# Patient Record
Sex: FEMALE | URBAN - METROPOLITAN AREA
[De-identification: names, ages, dates, MRNs, and addresses within clinical notes are randomized per-mention and may not be internally consistent; named-entity substitution may affect disease eponyms.]

---

## 2017-08-10 ENCOUNTER — EMERGENCY (EMERGENCY)
Facility: HOSPITAL | Age: 22
LOS: 1 days | Discharge: HOME | End: 2017-08-10
Attending: EMERGENCY MEDICINE | Admitting: EMERGENCY MEDICINE
Payer: COMMERCIAL

## 2017-08-10 VITALS
DIASTOLIC BLOOD PRESSURE: 75 MMHG | OXYGEN SATURATION: 100 % | WEIGHT: 139.99 LBS | RESPIRATION RATE: 17 BRPM | HEART RATE: 74 BPM | HEIGHT: 62 IN | TEMPERATURE: 98 F | SYSTOLIC BLOOD PRESSURE: 121 MMHG

## 2017-08-10 DIAGNOSIS — Z79.2 LONG TERM (CURRENT) USE OF ANTIBIOTICS: ICD-10-CM
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
## 2017-08-10 DIAGNOSIS — S80.862A INSECT BITE (NONVENOMOUS), LEFT LOWER LEG, INITIAL ENCOUNTER: ICD-10-CM
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
## 2017-08-10 PROCEDURE — 99283 EMERGENCY DEPT VISIT LOW MDM: CPT

## 2017-08-10 RX ADMIN — Medication 200 MILLIGRAM(S): at 14:56

## 2017-08-10 NOTE — ED PROVIDER NOTE - OBJECTIVE STATEMENT
Pt with no sig PMHx p/w bite to LLE, onset this morning. Pt is concerned it is a tick bite because it is larger than normal and painful. Pt did not see any ticks on her this weekend, but pt was in [place] this weekend, and has been in the [facility] intermittently throughout the summer. The bite is not itchy. No f/c. No other complaints.

## 2017-08-10 NOTE — ED ADULT NURSE NOTE - OBJECTIVE STATEMENT
Pt came to ED complaining of pain, redness, swelling, possible insect bit to left posterior leg.  Pt reports she was on [facility] for the past several days.  Pt reports she "wasn't feeling well, then the pain started today". Pt denies fever, chills, Dizziness, vomiting. Reports mild nausea.  The is a small approx. 0.2cm bite to left lower leg with redness, swelling, and tenderness.  Pt denies chest pain, SOB, abd pain, /GI symptoms.  Alert and oriented x3.

## 2017-08-10 NOTE — ED PROVIDER NOTE - MEDICAL DECISION MAKING DETAILS
Pt presents for evaluation of possible tick bite. Bite appeared this morning s/p traveling to endemic region for Lyme's. No clear target lesion. mildly ecchymotic appearing. No tick noted on body. Line drawn around area of erythema for close observation. Patient information regarding Lyme's and onset of prophylaxis printed for patient and given to pt for information. Pt does not have PCP. RC to set pt up with PCP for f/u visit. If pt unable to f/u in 2 days, will bring back to ED for wound check. Pt instructed to RTC for spreading of erythema, worsening erythema, bull's eye / target lesion, or other concerning sx, as she may need treatment rather than just prophylaxis for Lyme's. Will give Doxy 200 mg x 1 now for prophylaxis. No tick visualization, however given endemic region, benefit of prophylaxis outweighs risk of one-time abx dosing

## 2017-08-10 NOTE — ED ADULT NURSE REASSESSMENT NOTE - NS ED NURSE REASSESS COMMENT FT1
Pt is discharged.  Pt left ED without notifying RN or ED staff or receiving DC instructions or appt info for follow up.  MD Colmenares left voicemail for pt. Pt is alert and oriented x3, ambulatory with even gait.

## 2017-08-10 NOTE — ED ADULT NURSE NOTE - OTHER COMPLAINTS
pt reports being in [facility] over the weekend, admits to possible tick bite. c.o LLE pain, swelling/redness. denies fever/chills.

## 2017-08-10 NOTE — ED PROVIDER NOTE - SKIN, MLM
Skin normal color for race, warm, dry and intact. circular area of mild erythema approx 5-6 cm in diameter w/ central excoriation, mildly ecchymotic appearing. no induration / /fluctuance. no streaking, crepitus, or vesicles. no clear target lesion. + mild ttp

## 2017-08-10 NOTE — ED PROVIDER NOTE - PROGRESS NOTE DETAILS
Unable to find pt s/p receiving Doxycyline. Pt did not receive discharge instructions, nor did she receive the appt made by CHELY Medley (Dr Lemon 8/29 @ 11am). This appt was emailed to pt at time noted pt had left the ED, and I called the pt and left a message on the number listed in this EMR.

## 2017-08-10 NOTE — ED ADULT TRIAGE NOTE - OTHER COMPLAINTS
pt reports being in [place] [place], admits to possible tick bite. c.o LLE pain, swelling/redness. denies fever/chills.

## 2017-08-23 ENCOUNTER — RESULT REVIEW (OUTPATIENT)
Age: 22
End: 2017-08-23

## 2017-08-29 ENCOUNTER — APPOINTMENT (OUTPATIENT)
Dept: INTERNAL MEDICINE | Facility: CLINIC | Age: 22
End: 2017-08-29

## 2018-04-24 NOTE — ED PROVIDER NOTE - CROS ED CONS ALL NEG
negative...
I personally performed the service described in the documentation recorded by the scribe in my presence, and it accurately and completely records my words and actions.

## 2018-07-09 ENCOUNTER — APPOINTMENT (OUTPATIENT)
Dept: OPHTHALMOLOGY | Facility: CLINIC | Age: 23
End: 2018-07-09
Payer: COMMERCIAL

## 2018-07-09 ENCOUNTER — TRANSCRIPTION ENCOUNTER (OUTPATIENT)
Age: 23
End: 2018-07-09

## 2018-07-09 DIAGNOSIS — L25.3 UNSPECIFIED CONTACT DERMATITIS DUE TO OTHER CHEMICAL PRODUCTS: ICD-10-CM

## 2018-07-09 PROBLEM — Z00.00 ENCOUNTER FOR PREVENTIVE HEALTH EXAMINATION: Status: ACTIVE | Noted: 2018-07-09

## 2018-07-09 PROCEDURE — 92002 INTRM OPH EXAM NEW PATIENT: CPT

## 2018-07-09 RX ORDER — FLUOROMETHOLONE 1 MG/G
0.1 OINTMENT OPHTHALMIC 3 TIMES DAILY
Qty: 5 | Refills: 1 | Status: ACTIVE | COMMUNITY
Start: 2018-07-09 | End: 1900-01-01

## 2018-07-12 ENCOUNTER — MEDICATION RENEWAL (OUTPATIENT)
Age: 23
End: 2018-07-12

## 2018-07-12 RX ORDER — LOTEPREDNOL ETABONATE 5 MG/G
0.5 OINTMENT OPHTHALMIC 3 TIMES DAILY
Qty: 5 | Refills: 2 | Status: ACTIVE | COMMUNITY
Start: 2018-07-12 | End: 1900-01-01

## 2018-11-23 ENCOUNTER — RESULT REVIEW (OUTPATIENT)
Age: 23
End: 2018-11-23

## 2020-10-24 ENCOUNTER — TRANSCRIPTION ENCOUNTER (OUTPATIENT)
Age: 25
End: 2020-10-24

## 2020-12-11 ENCOUNTER — TRANSCRIPTION ENCOUNTER (OUTPATIENT)
Age: 25
End: 2020-12-11

## 2023-10-11 RX ORDER — AMPICILLIN SODIUM AND SULBACTAM SODIUM 250; 125 MG/ML; MG/ML
0 INJECTION, POWDER, FOR SUSPENSION INTRAMUSCULAR; INTRAVENOUS
Qty: 0 | Refills: 0 | DISCHARGE

## 2024-07-25 ENCOUNTER — APPOINTMENT (OUTPATIENT)
Dept: OBGYN | Facility: CLINIC | Age: 29
End: 2024-07-25

## 2024-07-25 VITALS
WEIGHT: 129 LBS | HEIGHT: 62 IN | DIASTOLIC BLOOD PRESSURE: 68 MMHG | SYSTOLIC BLOOD PRESSURE: 103 MMHG | BODY MASS INDEX: 23.74 KG/M2

## 2024-07-25 DIAGNOSIS — Z31.69 ENCOUNTER FOR OTHER GENERAL COUNSELING AND ADVICE ON PROCREATION: ICD-10-CM

## 2024-07-25 DIAGNOSIS — Z01.419 ENCOUNTER FOR GYNECOLOGICAL EXAMINATION (GENERAL) (ROUTINE) W/OUT ABNORMAL FINDINGS: ICD-10-CM

## 2024-07-25 PROCEDURE — 99385 PREV VISIT NEW AGE 18-39: CPT

## 2024-07-25 NOTE — PHYSICAL EXAM
[Chaperone Present] : A chaperone was present in the examining room during all aspects of the physical examination [10887] : A chaperone was present during the pelvic exam. [Appropriately responsive] : appropriately responsive [Alert] : alert [No Acute Distress] : no acute distress [No Lymphadenopathy] : no lymphadenopathy [Regular Rate Rhythm] : regular rate rhythm [No Murmurs] : no murmurs [Clear to Auscultation B/L] : clear to auscultation bilaterally [Soft] : soft [Non-tender] : non-tender [Non-distended] : non-distended [No HSM] : No HSM [No Lesions] : no lesions [No Mass] : no mass [Oriented x3] : oriented x3 [Examination Of The Breasts] : a normal appearance [No Masses] : no breast masses were palpable [Labia Majora] : normal [Labia Minora] : normal [Normal] : normal [Uterine Adnexae] : normal [FreeTextEntry2] : Bernard Crespo

## 2024-07-25 NOTE — PLAN
[FreeTextEntry1] : Routine Gyn Exam: BSA, calcium, vitamin D and exercise d/w pt Breast and pelvic exam performed Pap/HPV conducted Advised pt to see PCP and dermatology annually   Preconception: Advised pt d/c Ozempic 2 months before trying to conceive Pt to send ECS F/u when pregnant Preconception Counseling: Discussed diet, exercise, medications, home environment, medical Hx, Family Hx, genetic Hx. Advised of the nature of our practice, where we deliver  Optimal timing of intercourse discussed, ovulation test, HPT. PNV daily advised with DHA and 400mcg folic acid Avoid lead exposure, EtOH, NSAIDs. and overheating (hot tub, hot sauna)  RTO in 1 yr or PRN

## 2024-07-25 NOTE — SIGNATURES
[TextEntry] : This note was written by tana Braga, on 07/25/2024 actively solely by MAKENZIE Strickland M.D.   All medical record entries made by the tana were at my, MAKENZIE Strickland M.D. direction and personally dictated by me on 07/25/2024. I have personally reviewed the chart and agree that the record reflects my personal performance of the history, physical exam, assessment, and plan.

## 2024-07-25 NOTE — HISTORY OF PRESENT ILLNESS
[FreeTextEntry1] : PARKER BALTAZAR 29 year old female, , LMP: 24, presents to establish care and for an annual gyn exam.   She reports monthly menses since delivering her son in 2023, not too heavy, not painful. She denies intermenstrual bleeding. Prior to pregnancy, she reports her menses were irregular.   She denies abdominal and pelvic pain, no vaginal discharge or vaginitis symptoms. She reports normal urination, no dysuria, no incontinence of urine. BM is normal per patient, no blood in stool or constipation/diarrhea.   She sees PCP and dermatologist annually.  Pt reports she might try to conceive this upcoming winter. She had ECS done at [facility] - no overlap per pt.  Obhx: , FT   - Fortunato, 7lb 12oz, delivered @ [place] GYNhx: PCOS. Saw MARIALUISA in the [facility] - went on Clomid and Letrozole, s/p 1 failed IUI, then conceived naturally with Letrozole. No Hx of abnl pap smear, ovarian cysts, No Hx Fibroid, STD, or PID. PMH: PCOS PSH: denies Med: Ozempic All: NKDA Soc: social alcohol use, denies T/D. Pt moved from the city back to [place]. Famhx: maternal aunt- breast ca in her 40s, BRCA neg, mother- BRCA neg, MGF- prostate ca in 70s. Denies FHx of ovarian, colon, uterine, or pancreatic cancer. S/P gardasil  [No] : Patient does not have concerns regarding sex

## 2024-07-27 LAB — HPV HIGH+LOW RISK DNA PNL CVX: NOT DETECTED

## 2024-07-30 LAB — CYTOLOGY CVX/VAG DOC THIN PREP: NORMAL
